# Patient Record
Sex: FEMALE | Race: WHITE | Employment: PART TIME | URBAN - METROPOLITAN AREA
[De-identification: names, ages, dates, MRNs, and addresses within clinical notes are randomized per-mention and may not be internally consistent; named-entity substitution may affect disease eponyms.]

---

## 2020-01-31 ENCOUNTER — TRANSFERRED RECORDS (OUTPATIENT)
Dept: HEALTH INFORMATION MANAGEMENT | Facility: CLINIC | Age: 55
End: 2020-01-31

## 2021-08-04 NOTE — PROGRESS NOTES
SUBJECTIVE:                                                   Leigh Lew is a 55 year old female who presents to clinic today for the following health issue(s):  Patient presents with:  Abdominal Pain: has been having bloating and abdominal pain that started all of a sudden in April.      HPI: The patient is a 55-year-old who is seen at this time with a 3-month history of lower abdominal bloating. She has a past history of IBS and 3 years ago had her gallbladder removed. She relates periods of time of constipation for 3 to 4 days followed by diarrhea for a day or 2. She feels that some of the cramping sensation is unrelated to her constipation. She has had a previous supracervical hysterectomy with removal of both tubes and ovaries. She has had no vaginal bleeding and relates no true pelvic pain.      No LMP recorded. Patient has had a hysterectomy..     Patient is sexually active, No obstetric history on file..  Using hysterectomy for contraception.    reports that she has never smoked. She has never used smokeless tobacco.    STD testing offered?  Declined    Health maintenance updated:  Needs pap    Today's PHQ-9 Score:   PHQ-9 SCORE 8/5/2021   PHQ-9 Total Score 2     Today's RAJANI-7 Score:   RAJANI-7 SCORE 8/5/2021   Total Score 2       Problem list and histories reviewed & adjusted, as indicated.  Additional history: as documented.    There is no problem list on file for this patient.    Past Surgical History:   Procedure Laterality Date     GALLBLADDER SURGERY       LAPAROSCOPIC HYSTERECTOMY SUPRACERVICAL       LAPAROSCOPIC OOPHORECTOMY Bilateral 2012     MASTECTOMY, BILATERAL  2011      Social History     Tobacco Use     Smoking status: Never Smoker     Smokeless tobacco: Never Used   Substance Use Topics     Alcohol use: Yes      Problem (# of Occurrences) Relation (Name,Age of Onset)    Colon Cancer (1) Paternal Grandmother    Hyperlipidemia (1) Mother    Hypertension (2) Mother, Maternal Grandmother     "Osteoporosis (2) Mother, Maternal Grandmother    Ovarian Cancer (1) Maternal Aunt            Current Outpatient Medications   Medication Sig     GABAPENTIN PO      galcanezumab-gnlm (EMGALITY) 120 MG/ML injection      ketorolac (TORADOL) 10 MG tablet Take 10 mg by mouth every 6 hours as needed for moderate pain     lamoTRIgine (LAMICTAL) 100 MG tablet Take 100 mg by mouth     Lasmiditan Succinate (REYVOW) 100 MG TABS TK ONE T PO ONCE PRN MIGRAINE     memantine (NAMENDA) 10 MG tablet Take 2 tablets by mouth     TIZANIDINE HCL PO      traMADol (ULTRAM) 50 MG tablet Take 50 mg by mouth     No current facility-administered medications for this visit.     No Known Allergies    ROS:  12 point review of systems negative other than symptoms noted below or in the HPI.  Gastrointestinal: Abdominal Pain and Bloating  No urinary frequency or dysuria, bladder or kidney problems      OBJECTIVE:     /78   Pulse 80   Ht 1.651 m (5' 5\")   Wt 65.3 kg (144 lb)   BMI 23.96 kg/m    Body mass index is 23.96 kg/m .    Exam:  Constitutional:  Appearance: Well nourished, well developed alert, in no acute distress  Gastrointestinal:  Abdominal Examination:  Abdomen nontender to palpation, tone normal without rigidity or guarding, no masses present, umbilicus without lesions; Liver/Spleen:  No hepatomegaly present, liver nontender to palpation; Hernias:  No hernias present  Lymphatic: Lymph Nodes:  No other lymphadenopathy present  Skin: General Inspection:  No rashes present, no lesions present, no areas of discoloration.  Neurologic:  Mental Status:  Oriented X3.  Normal strength and tone, sensory exam grossly normal, mentation intact and speech normal.    Psychiatric:  Mentation appears normal and affect normal/bright.  Pelvic Exam:  External Genitalia:     Normal appearance for age, no discharge present, no tenderness present, no inflammatory lesions present, color normal  Vagina:     Normal vaginal vault without central or " paravaginal defects, no discharge present, no inflammatory lesions present, no masses present  Bladder:     Nontender to palpation  Urethra:   Urethral Body:  Urethra palpation normal, urethra structural support normal   Urethral Meatus:  No erythema or lesions present  Cervix:    Appearance healthy, no lesions present, nontender to palpation, no bleeding present  Uterus:     Surgically absent  Adnexa:     Surgically absent  Perineum:     Perineum within normal limits, no evidence of trauma, no rashes or skin lesions present  Anus:     Anus within normal limits, no hemorrhoids present  Inguinal Lymph Nodes:     No lymphadenopathy present  Pubic Hair:     Normal pubic hair distribution for age  Genitalia and Groin:     No rashes present, no lesions present, no areas of discoloration, no masses present       I reviewed her abdominal x-rays from 727 that showed no sign of any obstructive bowel pattern.      ASSESSMENT/PLAN:                                                        ICD-10-CM    1. Screening for cervical cancer  Z12.4 Pap imaged thin layer screen with HPV - recommended age 30 - 65 years (select HPV order below)   2. Abdominal pain, generalized  R10.84      55-year-old with previous supracervical hysterectomy with bilateral salpingo-oophorectomy. Her pelvic examination shows normal external genitalia vagina and cervix. Her Pap smear is updated. The cervix is mobile and nontender. There is no mass-effect above this at all. I feel that her cramping and bloating is not related to any GYN origin at this time. There does not appear to be any pelvic adhesive disease or any sign of GYN pathology in the pelvis. We will refer her back to her internist at this time.      Stiven Jean-Baptiste MD  [facility] [place]

## 2021-08-05 ENCOUNTER — OFFICE VISIT (OUTPATIENT)
Dept: OBGYN | Facility: CLINIC | Age: 56
End: 2021-08-05
Payer: COMMERCIAL

## 2021-08-05 VITALS
BODY MASS INDEX: 23.99 KG/M2 | WEIGHT: 144 LBS | HEART RATE: 80 BPM | DIASTOLIC BLOOD PRESSURE: 78 MMHG | HEIGHT: 65 IN | SYSTOLIC BLOOD PRESSURE: 112 MMHG

## 2021-08-05 DIAGNOSIS — Z12.4 SCREENING FOR CERVICAL CANCER: Primary | ICD-10-CM

## 2021-08-05 DIAGNOSIS — R10.84 ABDOMINAL PAIN, GENERALIZED: ICD-10-CM

## 2021-08-05 PROCEDURE — G0145 SCR C/V CYTO,THINLAYER,RESCR: HCPCS | Performed by: OBSTETRICS & GYNECOLOGY

## 2021-08-05 PROCEDURE — 99202 OFFICE O/P NEW SF 15 MIN: CPT | Performed by: OBSTETRICS & GYNECOLOGY

## 2021-08-05 PROCEDURE — 87624 HPV HI-RISK TYP POOLED RSLT: CPT | Performed by: OBSTETRICS & GYNECOLOGY

## 2021-08-05 RX ORDER — LAMOTRIGINE 100 MG/1
100 TABLET ORAL
COMMUNITY

## 2021-08-05 RX ORDER — KETOROLAC TROMETHAMINE 10 MG/1
10 TABLET, FILM COATED ORAL EVERY 6 HOURS PRN
COMMUNITY

## 2021-08-05 RX ORDER — TRAMADOL HYDROCHLORIDE 50 MG/1
50 TABLET ORAL
COMMUNITY

## 2021-08-05 RX ORDER — GALCANEZUMAB 120 MG/ML
INJECTION, SOLUTION SUBCUTANEOUS
COMMUNITY
Start: 2019-12-03

## 2021-08-05 RX ORDER — MEMANTINE HYDROCHLORIDE 10 MG/1
2 TABLET ORAL
COMMUNITY
Start: 2020-01-31

## 2021-08-05 RX ORDER — LASMIDITAN 100 MG/1
TABLET ORAL
COMMUNITY
Start: 2020-06-15

## 2021-08-05 ASSESSMENT — ANXIETY QUESTIONNAIRES
GAD7 TOTAL SCORE: 2
1. FEELING NERVOUS, ANXIOUS, OR ON EDGE: SEVERAL DAYS
7. FEELING AFRAID AS IF SOMETHING AWFUL MIGHT HAPPEN: NOT AT ALL
3. WORRYING TOO MUCH ABOUT DIFFERENT THINGS: SEVERAL DAYS
6. BECOMING EASILY ANNOYED OR IRRITABLE: NOT AT ALL
5. BEING SO RESTLESS THAT IT IS HARD TO SIT STILL: NOT AT ALL
2. NOT BEING ABLE TO STOP OR CONTROL WORRYING: NOT AT ALL
IF YOU CHECKED OFF ANY PROBLEMS ON THIS QUESTIONNAIRE, HOW DIFFICULT HAVE THESE PROBLEMS MADE IT FOR YOU TO DO YOUR WORK, TAKE CARE OF THINGS AT HOME, OR GET ALONG WITH OTHER PEOPLE: NOT DIFFICULT AT ALL

## 2021-08-05 ASSESSMENT — PATIENT HEALTH QUESTIONNAIRE - PHQ9
5. POOR APPETITE OR OVEREATING: NOT AT ALL
SUM OF ALL RESPONSES TO PHQ QUESTIONS 1-9: 2

## 2021-08-05 ASSESSMENT — MIFFLIN-ST. JEOR: SCORE: 1259.06

## 2021-08-06 ASSESSMENT — ANXIETY QUESTIONNAIRES: GAD7 TOTAL SCORE: 2

## 2021-08-10 LAB
BKR LAB AP GYN ADEQUACY: NORMAL
BKR LAB AP GYN INTERPRETATION: NORMAL
BKR LAB AP HPV REFLEX: NORMAL
BKR LAB AP PREVIOUS ABNORMAL: NORMAL
PATH REPORT.COMMENTS IMP SPEC: NORMAL
PATH REPORT.RELEVANT HX SPEC: NORMAL

## 2021-08-12 LAB
HUMAN PAPILLOMA VIRUS 16 DNA: NEGATIVE
HUMAN PAPILLOMA VIRUS 18 DNA: NEGATIVE
HUMAN PAPILLOMA VIRUS FINAL DIAGNOSIS: NORMAL
HUMAN PAPILLOMA VIRUS OTHER HR: NEGATIVE